# Patient Record
(demographics unavailable — no encounter records)

---

## 2025-07-08 NOTE — HISTORY OF PRESENT ILLNESS
[N] : Patient does not use contraception [Y] : Patient is sexually active [No] : Patient does not have concerns regarding sex [Currently Active] : currently active [FreeTextEntry1] : 52 year-old patient presents today for an annual exam. Patient is doing well. Pt reports irregular menses. Pt states that she has yet to see the gastroenterologist for bloating. Pt reports that when she occasionally has itchiness and an odor when she eats sweets or lots of fruits. Culture recently was negative.  We reviewed together in detail her past medical and surgical histories, allergies and medication usage, and social and family history. All questions were answered in an easy-to-understand language.     [Mammogramdate] : NA [BreastSonogramDate] : NA [PapSmeardate] : 2018 [BoneDensityDate] : NA [ColonoscopyDate] : NA [TextBox_78] : No HPV  history  [LMPDate] : 05/25/25 [MensesFreq] : 3-5 [PGHxTotal] : 5 [facility] : [facility]

## 2025-07-08 NOTE — PHYSICAL EXAM
[Chaperoned Physical Exam] : A chaperone was present in the examining room during all aspects of the physical examination. [Appropriately responsive] : appropriately responsive [Alert] : alert [No Acute Distress] : no acute distress [No Lymphadenopathy] : no lymphadenopathy [Clear to Auscultation B/L] : clear to auscultation bilaterally [Soft] : soft [Non-tender] : non-tender [Non-distended] : non-distended [No HSM] : No HSM [No Lesions] : no lesions [No Mass] : no mass [Oriented x3] : oriented x3 [Examination Of The Breasts] : a normal appearance [No Masses] : no breast masses were palpable [Labia Majora] : normal [Labia Minora] : normal [Normal] : normal [Uterine Adnexae] : normal [FreeTextEntry2] : This note was written by Lora Ghotra actively solely DR. KIKA M.D.   All medical record entries made by the Scribe were at my, DR. KIKA M.D., direction and personally dictated by me during this visit. I have personally reviewed the chart and agree that the record reflects my personal performance of the history, physical exam, assessment, and plan.

## 2025-07-08 NOTE — PLAN
[FreeTextEntry1] : I have spent 40 minutes of time on this encounter. Greater than 50% of the face-to-face encounter time was spent on counseling and/or coordination of care for examination findings, differential, testing, management, and planning. 10 minutes were allotted to discussing the depression screening.    Yearly breast cancer screening with no current clinical or radiographic concerns. The patient was reminded regarding future well breast and general healthcare, breast cancer risk reduction, the importance of self-examination, and the need for follow-up. She was again reminded of the need to take Vit D3 2500 IU daily or to keep a Vit D level above 30, and Tumeric 1000 mg daily with Black Pepper. Plan continued yearly imaging and breast follow-up, sooner as needed. I counseled the patient on current recommendations to reduce breast cancer risk including but not inclusive to regular exercise 20-30 minutes 3-4 times a week, low-fat diet, limiting alcohol consumption, maintenance of ideal body weight, yearly imaging, and self-breast awareness. Questions answered. I encouraged in light of Covid 19, social distancing, frequent hand washing, and precautions to stay healthy.   1) Self breast exam instructions and mammography discussed with the patient. 2) Lipid profile assessment, TSH screening, fasting glucose testing, and vitamin D supplementation were discussed with the patient. 3) Maintain healthy weight. 4) Regular health maintenance with PCP. 5) Remain tobacco free. 6) Limit alcohol intake to less than 5 drinks per week. 7) Osteoporosis screening and colonoscopy screening. 8) Annual cholesterol screening. 9) Annual influenza vaccine. 10) The importance of routine physical activity was reviewed and a goal of 150 minutes of moderately vigorous exercise per week was endorsed.   Recommended pt to  an over-the-counter douche to use once a month when needed. All questions were answered.